# Patient Record
Sex: FEMALE
[De-identification: names, ages, dates, MRNs, and addresses within clinical notes are randomized per-mention and may not be internally consistent; named-entity substitution may affect disease eponyms.]

---

## 2021-11-07 ENCOUNTER — HOSPITAL ENCOUNTER (EMERGENCY)
Dept: HOSPITAL 97 - ER | Age: 46
Discharge: HOME | End: 2021-11-07
Payer: COMMERCIAL

## 2021-11-07 VITALS — OXYGEN SATURATION: 98 % | DIASTOLIC BLOOD PRESSURE: 66 MMHG | SYSTOLIC BLOOD PRESSURE: 143 MMHG

## 2021-11-07 VITALS — TEMPERATURE: 98.2 F

## 2021-11-07 DIAGNOSIS — R07.9: Primary | ICD-10-CM

## 2021-11-07 DIAGNOSIS — I10: ICD-10-CM

## 2021-11-07 LAB
ALBUMIN SERPL BCP-MCNC: 3.6 G/DL (ref 3.4–5)
ALP SERPL-CCNC: 79 U/L (ref 45–117)
ALT SERPL W P-5'-P-CCNC: 38 U/L (ref 12–78)
AST SERPL W P-5'-P-CCNC: 18 U/L (ref 15–37)
BUN BLD-MCNC: 12 MG/DL (ref 7–18)
GLUCOSE SERPLBLD-MCNC: 107 MG/DL (ref 74–106)
HCT VFR BLD CALC: 38.7 % (ref 36–45)
INR BLD: 1.03
LYMPHOCYTES # SPEC AUTO: 2.3 K/UL (ref 0.7–4.9)
MAGNESIUM SERPL-MCNC: 2 MG/DL (ref 1.8–2.4)
NT-PROBNP SERPL-MCNC: 128 PG/ML (ref ?–125)
PMV BLD: 9.3 FL (ref 7.6–11.3)
POTASSIUM SERPL-SCNC: 4.1 MMOL/L (ref 3.5–5.1)
RBC # BLD: 4.17 M/UL (ref 3.86–4.86)
TROPONIN (EMERG DEPT USE ONLY): < 0.02 NG/ML (ref 0–0.04)

## 2021-11-07 PROCEDURE — 99284 EMERGENCY DEPT VISIT MOD MDM: CPT

## 2021-11-07 PROCEDURE — 85025 COMPLETE CBC W/AUTO DIFF WBC: CPT

## 2021-11-07 PROCEDURE — 80048 BASIC METABOLIC PNL TOTAL CA: CPT

## 2021-11-07 PROCEDURE — 83880 ASSAY OF NATRIURETIC PEPTIDE: CPT

## 2021-11-07 PROCEDURE — 83735 ASSAY OF MAGNESIUM: CPT

## 2021-11-07 PROCEDURE — 85610 PROTHROMBIN TIME: CPT

## 2021-11-07 PROCEDURE — 84484 ASSAY OF TROPONIN QUANT: CPT

## 2021-11-07 PROCEDURE — 93005 ELECTROCARDIOGRAM TRACING: CPT

## 2021-11-07 PROCEDURE — 36415 COLL VENOUS BLD VENIPUNCTURE: CPT

## 2021-11-07 PROCEDURE — 71045 X-RAY EXAM CHEST 1 VIEW: CPT

## 2021-11-07 PROCEDURE — 80076 HEPATIC FUNCTION PANEL: CPT

## 2021-11-07 NOTE — RAD REPORT
EXAM DESCRIPTION:  Kaveh Single View11/7/2021 9:09 am

 

CLINICAL HISTORY:  Chest pain

 

COMPARISON:  none

 

FINDINGS:   The lungs appear clear of acute infiltrate. The heart is normal size

 

IMPRESSION:   No acute abnormalities displayed

## 2021-11-07 NOTE — ER
Nurse's Notes                                                                                     

 [facility]                                                                 

Name: Dori Redding                                                                              

Age: 46 yrs                                                                                       

Sex: Female                                                                                       

: 1975                                                                                   

MRN: [id number]                                                                                   

Arrival Date: 2021                                                                          

Time: 08:29                                                                                       

Account#: [id number]                                                                            

Bed 23                                                                                            

Private MD:                                                                                       

Diagnosis: Chest pain, unspecified;Essential (primary) hypertension                               

                                                                                                  

Presentation:                                                                                     

                                                                                             

08:41 Chief complaint: Patient states: "I am having some really high blood pressure. today my jd3 

      left arm is having some tingling with some chest pain and shortness of breath. blood        

      pressures at home at 205/106.". Coronavirus screen: At this time, the client does not       

      indicate any symptoms associated with coronavirus-19. Ebola Screen: Patient negative        

      for fever greater than or equal to 101.5 degrees Fahrenheit, and additional compatible      

      Ebola Virus Disease symptoms. Initial Sepsis Screen: Does the patient meet any 2            

      criteria? No. Patient's initial sepsis screen is negative. Does the patient have a          

      suspected source of infection? No. Patient's initial sepsis screen is negative. Risk        

      Assessment: Do you want to hurt yourself or someone else? Patient reports no desire to      

      harm self or others. Onset of symptoms was 2021.                                

08:41 Method Of Arrival: Ambulatory                                                           jd3 

08:41 Acuity: TINA 3                                                                           jd3 

                                                                                                  

OB/GYN:                                                                                           

08:45 LMP N/A -                                                                               tw2 

                                                                                                  

Historical:                                                                                       

- Allergies:                                                                                      

08:43 No Known Allergies;                                                                     jd3 

- Home Meds:                                                                                      

08:43 None [Active];                                                                          jd3 

- PMHx:                                                                                           

08:43 None;                                                                                   jd3 

- PSHx:                                                                                           

08:43  section; Appendectomy;                                                         jd3 

                                                                                                  

- Immunization history:: Adult Immunizations unknown, Client reports having NOT                   

  received the Covid vaccine. Flu vaccine status is unknown.                                      

- Social history:: Smoking status: Patient denies any tobacco usage or history of.                

                                                                                                  

                                                                                                  

Screenin:34 Abuse screen: Denies threats or abuse. Nutritional screening: No deficits noted.        tw2 

      Tuberculosis screening: No symptoms or risk factors identified. Fall Risk None              

      identified.                                                                                 

                                                                                                  

Assessment:                                                                                       

08:43 Reassessment: provider at bedside at this time.                                         tw2 

08:46 General: Appears in no apparent distress. Behavior is calm, cooperative, appropriate    tw2 

      for age. Pain: Complains of pain in anterior aspect of left upper chest and left            

      breast. Neuro: Level of Consciousness is awake, alert, obeys commands, Oriented to          

      person, place, time, Appropriate for age. Cardiovascular: Patient's skin is warm and        

      dry. Respiratory: Reports pain with respiration Airway is patent Respiratory effort is      

      even, unlabored, Respiratory pattern is regular, symmetrical. GI: No signs and/or           

      symptoms were reported involving the gastrointestinal system. : No signs and/or           

      symptoms were reported regarding the genitourinary system. Musculoskeletal: Range of        

      motion: intact in all extremities.                                                          

09:44 Reassessment: Patient appears in no apparent distress at this time. No changes from     tw2 

      previously documented assessment. Patient and/or family updated on plan of care and         

      expected duration. Pain level reassessed. Patient is alert, oriented x 3, equal             

      unlabored respirations, skin warm/dry/pink.                                                 

10:14 Reassessment: Patient appears in no apparent distress at this time. Patient and/or      vg1 

      family updated on plan of care and expected duration. Pain level reassessed. Patient is     

      alert, oriented x 3, equal unlabored respirations, skin warm/dry/pink. Patient denies       

      pain at this time.                                                                          

12:40 Reassessment: Patient appears in no apparent distress at this time. Patient and/or      vg1 

      family updated on plan of care and expected duration. Pain level reassessed. Patient is     

      alert, oriented x 3, equal unlabored respirations, skin warm/dry/pink. Pt states chest      

      pain, rates 4/10. Provider notified.                                                        

                                                                                                  

Vital Signs:                                                                                      

08:43  / 94; Pulse 86; Resp 18 S; Temp 98.2(O); Pulse Ox 100% on R/A; Weight 90.72 kg   jd3 

      (R); Height 5 ft. 6 in. (167.64 cm) (R); Pain 4/10;                                         

09:44  / 82; Pulse 70; Resp 17; Pulse Ox 99% on R/A;                                    tw2 

10:30  / 83; Pulse 70; Resp 15; Pulse Ox 100% ;                                         vg1 

11:30  / 79; Pulse 79; Resp 16; Pulse Ox 100% ;                                         vg1 

12:30  / 63; Pulse 70; Resp 16; Pulse Ox 100% ;                                         vg1 

13:00  / 66; Pulse 72; Resp 14; Pulse Ox 98% ;                                          vg1 

08:43 Body Mass Index 32.28 (90.72 kg, 167.64 cm)                                             [facility] 

                                                                                                  

ED Course:                                                                                        

08:29 Patient arrived in ED.                                                                  ds1 

08:34 Mindy Avalos, RN is Primary Nurse.                                                        tw2 

08:34 Justin Morales MD is Attending Physician.                                                sp3 

08:34 Arm band placed on.                                                                     tw2 

08:35 Initial lab(s) drawn, by me, sent to lab. EKG done. Inserted saline lock: 20 gauge in   kj1 

      right antecubital area, using aseptic technique. Blood collected.                           

08:43 Triage completed.                                                                       jd3 

08:43 Bed in low position. Call light in reach. Adult w/ patient. Cardiac monitor on. Pulse   tw2 

      ox on. NIBP on.                                                                             

08:43 EKG completed in triage. Results shown to MD.                                           tw2 

09:09 XRAY Chest (1 view) In Process Unspecified.                                             EDMS

09:59 Primary Nurse role handed off by Mindy Avalos, RN                                         vg1 

09:59 Mayra Lu RN is Primary Nurse.                                                  vg1 

12:41 Repeat lab(s) drawn. by me, sent to lab.                                                vg1 

13:19 EKG done, by ED staff, reviewed by Justin Morales MD.                                      vg1 

13:47 Ernie Varghese MD is Referral Physician.                                               sp3 

14:03 No provider procedures requiring assistance completed. IV discontinued, intact,         jl7 

      bleeding controlled, No redness/swelling at site. Pressure dressing applied.                

                                                                                                  

Administered Medications:                                                                         

No medications were administered                                                                  

                                                                                                  

                                                                                                  

Outcome:                                                                                          

13:47 Discharge ordered by MD.                                                                sp3 

14:03 Discharged to home ambulatory.                                                          jl7 

14:03 Condition: stable                                                                           

14:03 Discharge instructions given to patient, Instructed on discharge instructions, follow       

      up and referral plans. Demonstrated understanding of instructions, follow-up care.          

14:03 Patient left the ED.                                                                    jl7 

                                                                                                  

Signatures:                                                                                       

Dispatcher [employer]                           [facility]                                                 

NayakMary mackenzie                                ds1                                                  

Mindy Avalos RN                          RN   tw2                                                  

Rachid Hunt RN                        RN   jl7                                                  

Rick Alberto RN                    RN   Christie Moreno                              kj1                                                  

Mayra Lu, STEVEN                    RN   vg1                                                  

Justin Morales MD MD   sp3                                                  

                                                                                                  

**************************************************************************************************

## 2021-11-07 NOTE — EDPHYS
Physician Documentation                                                                           

 [facility]                                                                 

Name: Dori Redding                                                                              

Age: 46 yrs                                                                                       

Sex: Female                                                                                       

: 1975                                                                                   

MRN: [id number]                                                                                   

Arrival Date: 2021                                                                          

Time: 08:29                                                                                       

Account#: [id number]                                                                            

Bed 23                                                                                            

Private MD:                                                                                       

ED Physician Justin Morales                                                                         

HPI:                                                                                              

                                                                                             

09:01 This 46 yrs old  Female presents to ER via Ambulatory with complaints of Chest sp3 

      Discomfort, High Blood Pressure, Tingling-L Arm.                                            

09:01 46-year-old female with no documented past medical history who does not regularly see a sp3 

      physician now presents with a 1 month history of periodic chest discomfort and blood        

      pressure readings normally in the 160s systolic. Today she arrived at work at [employer]       

      where she is a mariya at approximately 6 AM and 30 minutes later she developed             

      hypertension at 200/100 and subsequent mild chest discomfort which has resolved prior       

      to arrival. Her fianc urged her to come to the ED for evaluation. She denies any           

      headache, fever, URI symptoms, chest pain (described as a discomfort only), shortness       

      of breath, back pain, abdominal pain, nausea, vomiting, diarrhea, neurological              

      symptoms, rash, known sick contacts, known COVID-19 infection, travel history, any          

      other ROS at this time. All symptoms are now resolved. Patient has a family history of      

      coronary artery disease with her brother getting a stent in his late 40s..                  

                                                                                                  

OB/GYN:                                                                                           

08:45 LMP N/A -                                                                               tw2 

                                                                                                  

Historical:                                                                                       

- Allergies:                                                                                      

08:43 No Known Allergies;                                                                     jd3 

- Home Meds:                                                                                      

08:43 None [Active];                                                                          jd3 

- PMHx:                                                                                           

08:43 None;                                                                                   jd3 

- PSHx:                                                                                           

08:43  section; Appendectomy;                                                         jd3 

                                                                                                  

- Immunization history:: Adult Immunizations unknown, Client reports having NOT                   

  received the Covid vaccine. Flu vaccine status is unknown.                                      

- Social history:: Smoking status: Patient denies any tobacco usage or history of.                

                                                                                                  

                                                                                                  

ROS:                                                                                              

09:03 Constitutional: Negative for fever, chills, and weight loss, Eyes: Negative for injury, sp3 

      pain, redness, and discharge, ENT: Negative for injury, pain, and discharge, Neck:          

      Negative for injury, pain, and swelling, Respiratory: Negative for shortness of breath,     

      cough, wheezing, and pleuritic chest pain, Abdomen/GI: Negative for abdominal pain,         

      nausea, vomiting, diarrhea, and constipation, Back: Negative for injury and pain, :       

      Negative for injury, bleeding, discharge, and swelling, MS/Extremity: Negative for          

      injury and deformity, Skin: Negative for injury, rash, and discoloration, Neuro:            

      Negative for headache, weakness, numbness, tingling, and seizure, Psych: Negative for       

      depression, anxiety, suicide ideation, homicidal ideation, and hallucinations,              

      Allergy/Immunology: Negative for hives, rash, and allergies, Endocrine: Negative for        

      neck swelling, polydipsia, polyuria, polyphagia, and marked weight changes,                 

      Hematologic/Lymphatic: Negative for swollen nodes, abnormal bleeding, and unusual           

      bruising.                                                                                   

09:03 All other systems are negative.                                                             

                                                                                                  

Exam:                                                                                             

09:03 Constitutional:  This is a well developed, well nourished patient who is awake, alert,  sp3 

      and in no acute distress. Head/Face:  Normocephalic, atraumatic. Eyes:  Pupils equal        

      round and reactive to light, extra-ocular motions intact.  Lids and lashes normal.          

      Conjunctiva and sclera are non-icteric and not injected.  Cornea within normal limits.      

      Periorbital areas with no swelling, redness, or edema. ENT:  Nares patent. No nasal         

      discharge, no septal abnormalities noted.  External auditory canals are clear.              

      Oropharynx with no redness, swelling, or masses, exudates, or evidence of obstruction,      

      uvula midline.  Mucous membranes moist. Neck:  Trachea midline, no thyromegaly or           

      masses palpated, and no cervical lymphadenopathy.  Supple, full range of motion without     

      nuchal rigidity, or vertebral point tenderness.  No Meningismus. Chest/axilla:  Normal      

      chest wall appearance and motion.  Nontender with no deformity.  No lesions are             

      appreciated. Cardiovascular:  Regular rate and rhythm with a normal S1 and S2.  No          

      gallops, murmurs, or rubs.  Normal PMI, no JVD.  No pulse deficits. Respiratory:  Lungs     

      have equal breath sounds bilaterally, clear to auscultation and percussion.  No rales,      

      rhonchi or wheezes noted.  No increased work of breathing, no retractions or nasal          

      flaring. Abdomen/GI:  Soft, non-tender, with normal bowel sounds.  No distension or         

      tympany.  No guarding or rebound.  No evidence of tenderness throughout. Back:  No          

      spinal tenderness.  No costovertebral tenderness.  Full range of motion. Skin:  Warm,       

      dry with normal turgor.  Normal color with no rashes, no lesions, and no evidence of        

      cellulitis. MS/ Extremity:  Pulses equal, no cyanosis.  Neurovascular intact.  Full,        

      normal range of motion. Neuro:  Awake and alert, GCS 15, oriented to person, place,         

      time, and situation.  Cranial nerves II-XII grossly intact.  Motor strength 5/5 in all      

      extremities.  Sensory grossly intact.  Cerebellar exam normal.  Normal gait. Psych:         

      Awake, alert, with orientation to person, place and time.  Behavior, mood, and affect       

      are within normal limits.                                                                   

                                                                                                  

Vital Signs:                                                                                      

08:43  / 94; Pulse 86; Resp 18 S; Temp 98.2(O); Pulse Ox 100% on R/A; Weight 90.72 kg   jd3 

      (R); Height 5 ft. 6 in. (167.64 cm) (R); Pain 4/10;                                         

09:44  / 82; Pulse 70; Resp 17; Pulse Ox 99% on R/A;                                    tw2 

10:30  / 83; Pulse 70; Resp 15; Pulse Ox 100% ;                                         vg1 

11:30  / 79; Pulse 79; Resp 16; Pulse Ox 100% ;                                         vg1 

12:30  / 63; Pulse 70; Resp 16; Pulse Ox 100% ;                                         vg1 

13:00  / 66; Pulse 72; Resp 14; Pulse Ox 98% ;                                          vg1 

08:43 Body Mass Index 32.28 (90.72 kg, 167.64 cm)                                             jd3 

                                                                                                  

MDM:                                                                                              

08:34 Patient medically screened.                                                             sp3 

09:04 Data reviewed: vital signs, nurses notes, EKG. ED course: 46-year-old female with a     sp3 

      heart score of 2. Will obtain cardiac markers x2 4 hours apart. EKG demonstrates normal     

      sinus rhythm at 93 bpm with normal intervals, normal QRS, normal axis, nonspecific ST/T     

      changes without evidence of ischemia. Discharged to cardiology follow-up with               

      outpatient cardiac stress testing and follow-up for her hypertension. Given her             

      symptoms are fully resolved at this time outpatient follow-up is preferred. If symptoms     

      return or patient worsens in any way will consider 23-hour observation..                    

13:46 ED course: Second troponin negative. Repeat EKG also demonstrates no ischemic changes.  sp3 

      Will discharge patient home to cardiology follow-up for outpatient stress test..            

                                                                                                  

                                                                                             

08:36 Order name: Basic Metabolic Panel; Complete Time: 10:32                                 sp3 

                                                                                             

08:36 Order name: CBC with Diff; Complete Time: 10:32                                         sp3 

                                                                                             

08:36 Order name: LFT's; Complete Time: 10:32                                                 sp3 

                                                                                             

08:36 Order name: Magnesium; Complete Time: 10:32                                             sp3 

                                                                                             

08:36 Order name: NT PRO-BNP; Complete Time: 10:32                                            sp3 

                                                                                             

08:36 Order name: PT-INR; Complete Time: 10:32                                                sp3 

                                                                                             

08:36 Order name: Troponin (emerg Dept Use Only); Complete Time: 10:32                        sp3 

                                                                                             

08:36 Order name: XRAY Chest (1 view); Complete Time: 10:32                                   sp3 

                                                                                             

08:36 Order name: EKG; Complete Time: 08:37                                                   sp3 

                                                                                             

08:36 Order name: Cardiac monitoring; Complete Time: 08:44                                    sp3 

                                                                                             

08:36 Order name: EKG - Nurse/Tech; Complete Time: 08:44                                      sp3 

                                                                                             

08:36 Order name: IV Saline Lock; Complete Time: 08:44                                        sp3 

                                                                                             

08:54 Order name: Troponin (emerg Dept Use Only): Draw at 1230 PM                             sp3 

                                                                                             

08:55 Order name: Troponin (Emerg Dept Use Only); Complete Time: 13:46                        EDMS

                                                                                             

08:36 Order name: Labs collected and sent; Complete Time: 08:44                               sp3 

                                                                                             

08:36 Order name: O2 Per Protocol; Complete Time: 08:45                                       sp3 

                                                                                             

08:36 Order name: O2 Sat Monitoring; Complete Time: 08:45                                     sp3 

                                                                                                  

Administered Medications:                                                                         

No medications were administered                                                                  

                                                                                                  

                                                                                                  

Disposition Summary:                                                                              

21 13:47                                                                                    

Discharge Ordered                                                                                 

      Location: Home                                                                          sp3 

      Condition: Stable                                                                       sp3 

      Diagnosis                                                                                   

        - Chest pain, unspecified                                                             sp3 

        - Essential (primary) hypertension                                                    sp3 

      Followup:                                                                               sp3 

        - With: Ernie Varghese MD                                                                 

        - When: Upon discharge from the Emergency Department                                       

        - Reason: Recheck today's complaints                                                       

      Discharge Instructions:                                                                     

        - Discharge Summary Sheet                                                             sp3 

        - Nonspecific Chest Pain, Adult                                                       sp3 

      Forms:                                                                                      

        - Medication Reconciliation Form                                                      sp3 

        - Thank You Letter                                                                    sp3 

        - Antibiotic Education                                                                sp3 

        - Prescription Opioid Use                                                             sp3 

Signatures:                                                                                       

Dispatcher Marycarmen                           Rick Wilson RN                    RN   jd3                                                  

Justin Morales MD MD   sp3                                                  

                                                                                                  

**************************************************************************************************

## 2021-11-08 NOTE — EKG
Test Date:    2021-11-07               Test Time:    13:14:14

Technician:   RODNEY                                     

                                                     

MEASUREMENT RESULTS:                                       

Intervals:                                           

Rate:         64                                     

CA:           166                                    

QRSD:         80                                     

QT:           426                                    

QTc:          439                                    

Axis:                                                

P:            58                                     

CA:           166                                    

QRS:          2                                      

T:            75                                     

                                                     

INTERPRETIVE STATEMENTS:                                       

                                                     

Normal sinus rhythm with sinus arrhythmia

Possible Left atrial enlargement

Borderline ECG

Compared to ECG 11/07/2021 09:40:41

No significant changes



Electronically Signed On 11-08-21 18:23:47 CST by Ernie Varghese

## 2021-11-08 NOTE — EKG
Test Date:    2021-11-07               Test Time:    09:40:41

Technician:   MILY                                     

                                                     

MEASUREMENT RESULTS:                                       

Intervals:                                           

Rate:         90                                     

ND:           148                                    

QRSD:         80                                     

QT:           372                                    

QTc:          455                                    

Axis:                                                

P:            59                                     

ND:           148                                    

QRS:          -20                                    

T:            61                                     

                                                     

INTERPRETIVE STATEMENTS:                                       

                                                     

Normal sinus rhythm

Possible Left atrial enlargement

Borderline ECG

No previous ECG available for comparison



Electronically Signed On 11-08-21 18:23:52 CST by Ernie Varghese

## 2021-11-24 LAB
BUN BLD-MCNC: 12 MG/DL (ref 7–18)
GLUCOSE SERPLBLD-MCNC: 94 MG/DL (ref 74–106)
HCT VFR BLD CALC: 38.4 % (ref 36–45)
INR BLD: 1
LYMPHOCYTES # SPEC AUTO: 1.7 K/UL (ref 0.7–4.9)
PMV BLD: 9.3 FL (ref 7.6–11.3)
POTASSIUM SERPL-SCNC: 4.2 MMOL/L (ref 3.5–5.1)
RBC # BLD: 4.15 M/UL (ref 3.86–4.86)

## 2021-11-29 ENCOUNTER — HOSPITAL ENCOUNTER (OUTPATIENT)
Dept: HOSPITAL 97 - CCL | Age: 46
Discharge: HOME | End: 2021-11-29
Attending: INTERNAL MEDICINE
Payer: COMMERCIAL

## 2021-11-29 VITALS — SYSTOLIC BLOOD PRESSURE: 108 MMHG | DIASTOLIC BLOOD PRESSURE: 73 MMHG

## 2021-11-29 VITALS — TEMPERATURE: 98.6 F | OXYGEN SATURATION: 98 %

## 2021-11-29 DIAGNOSIS — R06.02: ICD-10-CM

## 2021-11-29 DIAGNOSIS — Z88.6: ICD-10-CM

## 2021-11-29 DIAGNOSIS — Q24.5: ICD-10-CM

## 2021-11-29 DIAGNOSIS — I20.0: Primary | ICD-10-CM

## 2021-11-29 DIAGNOSIS — I10: ICD-10-CM

## 2021-11-29 PROCEDURE — 80048 BASIC METABOLIC PNL TOTAL CA: CPT

## 2021-11-29 PROCEDURE — 93005 ELECTROCARDIOGRAM TRACING: CPT

## 2021-11-29 PROCEDURE — 36415 COLL VENOUS BLD VENIPUNCTURE: CPT

## 2021-11-29 PROCEDURE — 85025 COMPLETE CBC W/AUTO DIFF WBC: CPT

## 2021-11-29 PROCEDURE — 85610 PROTHROMBIN TIME: CPT

## 2021-11-29 PROCEDURE — 85730 THROMBOPLASTIN TIME PARTIAL: CPT

## 2021-11-29 PROCEDURE — 93458 L HRT ARTERY/VENTRICLE ANGIO: CPT

## 2021-11-29 NOTE — OP
Date of Procedure:  11/29/2021



Surgeon:  ASHLEY OSORIO



Procedures Performed:  

1.Selective coronary angiogram.

2.Left heart catheterization.



Indication:  Unstable angina symptoms.



Access:  Right radial artery 6-Omani closed with TR band.



Complications:  None.



Bleeding:  Less than 10 mL.



Description Of Procedure:  After risks, benefits, and alternatives were explained, the patient agreed
 to the procedure and signed informed consent process.  The patient was brought into cardiac catheter
ization laboratory, prepped and draped in usual sterile fashion.  Then, we accessed right radial javier
ry using pediatric micropuncture kit and placed a 6-Omani Slender sheath.  Then, I took a 4-Omani J
R4 catheter into the aortic root, engaged the left coronary artery, took standard views, and catheter
 was pushed over the wire into the LV.  Recorded LVEDP and pullback did not record any gradient.  The
n, I exchanged for a 4-Omani JL4 catheter, engaged the left main coronary artery, and took standard 
views and removed the catheter and wire and the sheath, and placed TR band with good hemostasis.



Findings:  

1.Left main is large and normal.

2.LAD, large vessel with mid to distal myocardial bridge area, but no disease.  All diagonal branche
s are normal.

3.Left circumflex, large vessel.  No disease.

4.Left CA, large, dominant and no disease.

5.LVEDP of 9 mmHg.



Conclusion:  

1.Normal coronary arteries with myocardial bridge in the mid to distal LAD, it is mild.

2.Normal LVEDP at 9 mmHg.



Recommendations:  Search for other causes of chest pain.





SR/MODL

DD:  11/29/2021 13:12:11Voice ID:  [id number]

DT:  11/29/2021 13:51:29Report ID:  [id number]